# Patient Record
Sex: FEMALE | Race: WHITE | URBAN - METROPOLITAN AREA
[De-identification: names, ages, dates, MRNs, and addresses within clinical notes are randomized per-mention and may not be internally consistent; named-entity substitution may affect disease eponyms.]

---

## 2017-06-12 ENCOUNTER — EMERGENCY (EMERGENCY)
Facility: HOSPITAL | Age: 52
LOS: 1 days | Discharge: HOME | End: 2017-06-12
Attending: INTERNAL MEDICINE | Admitting: INTERNAL MEDICINE
Payer: COMMERCIAL

## 2017-06-12 VITALS
TEMPERATURE: 99 F | SYSTOLIC BLOOD PRESSURE: 106 MMHG | OXYGEN SATURATION: 100 % | RESPIRATION RATE: 15 BRPM | HEART RATE: 63 BPM | DIASTOLIC BLOOD PRESSURE: 58 MMHG

## 2017-06-12 VITALS
HEIGHT: 68 IN | WEIGHT: 166.01 LBS | TEMPERATURE: 70 F | SYSTOLIC BLOOD PRESSURE: 108 MMHG | DIASTOLIC BLOOD PRESSURE: 60 MMHG | OXYGEN SATURATION: 100 %

## 2017-06-12 DIAGNOSIS — Z90.49 ACQUIRED ABSENCE OF OTHER SPECIFIED PARTS OF DIGESTIVE TRACT: Chronic | ICD-10-CM

## 2017-06-12 DIAGNOSIS — Z98.89 OTHER SPECIFIED POSTPROCEDURAL STATES: Chronic | ICD-10-CM

## 2017-06-12 DIAGNOSIS — Z98.84 BARIATRIC SURGERY STATUS: Chronic | ICD-10-CM

## 2017-06-12 DIAGNOSIS — Z87.828 PERSONAL HISTORY OF OTHER (HEALED) PHYSICAL INJURY AND TRAUMA: Chronic | ICD-10-CM

## 2017-06-12 LAB
ALBUMIN SERPL ELPH-MCNC: 3.2 G/DL — LOW (ref 3.3–5)
ALP SERPL-CCNC: 61 U/L — SIGNIFICANT CHANGE UP (ref 30–120)
ALT FLD-CCNC: 18 U/L DA — SIGNIFICANT CHANGE UP (ref 10–60)
ANION GAP SERPL CALC-SCNC: 6 MMOL/L — SIGNIFICANT CHANGE UP (ref 5–17)
APTT BLD: 34.3 SEC — SIGNIFICANT CHANGE UP (ref 27.5–37.4)
AST SERPL-CCNC: 20 U/L — SIGNIFICANT CHANGE UP (ref 10–40)
BASOPHILS # BLD AUTO: 0 K/UL — SIGNIFICANT CHANGE UP (ref 0–0.2)
BASOPHILS NFR BLD AUTO: 1 % — SIGNIFICANT CHANGE UP (ref 0–2)
BILIRUB SERPL-MCNC: 0.4 MG/DL — SIGNIFICANT CHANGE UP (ref 0.2–1.2)
BUN SERPL-MCNC: 8 MG/DL — SIGNIFICANT CHANGE UP (ref 7–23)
CALCIUM SERPL-MCNC: 8.4 MG/DL — SIGNIFICANT CHANGE UP (ref 8.4–10.5)
CHLORIDE SERPL-SCNC: 110 MMOL/L — HIGH (ref 96–108)
CK MB BLD-MCNC: 1.2 % — SIGNIFICANT CHANGE UP (ref 0–3.5)
CK MB CFR SERPL CALC: 0.3 NG/ML — SIGNIFICANT CHANGE UP (ref 0–3.6)
CK SERPL-CCNC: 26 U/L — SIGNIFICANT CHANGE UP (ref 26–192)
CO2 SERPL-SCNC: 25 MMOL/L — SIGNIFICANT CHANGE UP (ref 22–31)
CREAT SERPL-MCNC: 0.49 MG/DL — LOW (ref 0.5–1.3)
EOSINOPHIL # BLD AUTO: 0.1 K/UL — SIGNIFICANT CHANGE UP (ref 0–0.5)
EOSINOPHIL NFR BLD AUTO: 1.8 % — SIGNIFICANT CHANGE UP (ref 0–6)
GLUCOSE SERPL-MCNC: 99 MG/DL — SIGNIFICANT CHANGE UP (ref 70–99)
HCG SERPL-ACNC: <1 MIU/ML — SIGNIFICANT CHANGE UP
HCT VFR BLD CALC: 26.1 % — LOW (ref 34.5–45)
HGB BLD-MCNC: 7.8 G/DL — LOW (ref 11.5–15.5)
INR BLD: 1.15 RATIO — SIGNIFICANT CHANGE UP (ref 0.88–1.16)
LYMPHOCYTES # BLD AUTO: 2 K/UL — SIGNIFICANT CHANGE UP (ref 1–3.3)
LYMPHOCYTES # BLD AUTO: 47.1 % — HIGH (ref 13–44)
MCHC RBC-ENTMCNC: 18.1 PG — LOW (ref 27–34)
MCHC RBC-ENTMCNC: 29.9 GM/DL — LOW (ref 32–36)
MCV RBC AUTO: 60.4 FL — LOW (ref 80–100)
MONOCYTES # BLD AUTO: 0.3 K/UL — SIGNIFICANT CHANGE UP (ref 0–0.9)
MONOCYTES NFR BLD AUTO: 6.4 % — SIGNIFICANT CHANGE UP (ref 2–14)
NEUTROPHILS # BLD AUTO: 1.8 K/UL — SIGNIFICANT CHANGE UP (ref 1.8–7.4)
NEUTROPHILS NFR BLD AUTO: 43.7 % — SIGNIFICANT CHANGE UP (ref 43–77)
PLATELET # BLD AUTO: 246 K/UL — SIGNIFICANT CHANGE UP (ref 150–400)
POTASSIUM SERPL-MCNC: 3.8 MMOL/L — SIGNIFICANT CHANGE UP (ref 3.5–5.3)
POTASSIUM SERPL-SCNC: 3.8 MMOL/L — SIGNIFICANT CHANGE UP (ref 3.5–5.3)
PROT SERPL-MCNC: 6.7 G/DL — SIGNIFICANT CHANGE UP (ref 6–8.3)
PROTHROM AB SERPL-ACNC: 12.6 SEC — SIGNIFICANT CHANGE UP (ref 9.8–12.7)
RBC # BLD: 4.32 M/UL — SIGNIFICANT CHANGE UP (ref 3.8–5.2)
RBC # FLD: 17.8 % — HIGH (ref 10.3–14.5)
SODIUM SERPL-SCNC: 141 MMOL/L — SIGNIFICANT CHANGE UP (ref 135–145)
TROPONIN I SERPL-MCNC: 0.01 NG/ML — LOW (ref 0.02–0.06)
WBC # BLD: 4.2 K/UL — SIGNIFICANT CHANGE UP (ref 3.8–10.5)
WBC # FLD AUTO: 4.2 K/UL — SIGNIFICANT CHANGE UP (ref 3.8–10.5)

## 2017-06-12 PROCEDURE — 36415 COLL VENOUS BLD VENIPUNCTURE: CPT

## 2017-06-12 PROCEDURE — 85730 THROMBOPLASTIN TIME PARTIAL: CPT

## 2017-06-12 PROCEDURE — 86850 RBC ANTIBODY SCREEN: CPT

## 2017-06-12 PROCEDURE — 99285 EMERGENCY DEPT VISIT HI MDM: CPT

## 2017-06-12 PROCEDURE — 85027 COMPLETE CBC AUTOMATED: CPT

## 2017-06-12 PROCEDURE — 82553 CREATINE MB FRACTION: CPT

## 2017-06-12 PROCEDURE — 85610 PROTHROMBIN TIME: CPT

## 2017-06-12 PROCEDURE — 82550 ASSAY OF CK (CPK): CPT

## 2017-06-12 PROCEDURE — 99284 EMERGENCY DEPT VISIT MOD MDM: CPT | Mod: 25

## 2017-06-12 PROCEDURE — 86901 BLOOD TYPING SEROLOGIC RH(D): CPT

## 2017-06-12 PROCEDURE — P9016: CPT

## 2017-06-12 PROCEDURE — 36430 TRANSFUSION BLD/BLD COMPNT: CPT

## 2017-06-12 PROCEDURE — 93010 ELECTROCARDIOGRAM REPORT: CPT

## 2017-06-12 PROCEDURE — 84702 CHORIONIC GONADOTROPIN TEST: CPT

## 2017-06-12 PROCEDURE — 84484 ASSAY OF TROPONIN QUANT: CPT

## 2017-06-12 PROCEDURE — 86920 COMPATIBILITY TEST SPIN: CPT

## 2017-06-12 PROCEDURE — 93005 ELECTROCARDIOGRAM TRACING: CPT

## 2017-06-12 PROCEDURE — 80053 COMPREHEN METABOLIC PANEL: CPT

## 2017-06-12 PROCEDURE — 99285 EMERGENCY DEPT VISIT HI MDM: CPT | Mod: 25

## 2017-06-12 PROCEDURE — 86900 BLOOD TYPING SEROLOGIC ABO: CPT

## 2017-06-12 RX ORDER — ACETAMINOPHEN 500 MG
650 TABLET ORAL ONCE
Qty: 0 | Refills: 0 | Status: COMPLETED | OUTPATIENT
Start: 2017-06-12 | End: 2017-06-12

## 2017-06-12 RX ADMIN — Medication 650 MILLIGRAM(S): at 16:06

## 2017-06-12 RX ADMIN — Medication 650 MILLIGRAM(S): at 15:00

## 2017-06-12 NOTE — CONSULT NOTE ADULT - ASSESSMENT
52 yo woman with history of gastric bypass in 2000 with resultant malasorption of vitamins including B12 and Iron as demonstrated on blood tests from 7/2016; now presents again with profound microcytic anemia  - check stool guaiac  - transfuse 2 units pRBCs; if clinically stable, may be discharged home thereafter  - patient to follow up in office to make arrangements for IV iron therapy and parenteral B12 given post-bypass syndrome and lack of ability to absorb these nutrients orally  - case discussed with ER staff and patient

## 2017-06-12 NOTE — CONSULT NOTE ADULT - SUBJECTIVE AND OBJECTIVE BOX
Patient is a 51y old  Female who presents with a chief complaint of fatigue and malaise    HPI:  50 yo woman with history of gastric bypass surgery in  who was previously diagnosed with malabsorption of iron and was under the care of Dr. Keys, receiving IV iron therapy until beginning of  when she reports that her insurance would not allow her to do so any more. She has had multiple ER visits and required transfusions of pRBCs as she does not respond to oral iron replacement. In  while hospitalized at [facility] she underwent endoscopy and colonoscopy which she reports was negative. She now present to the ER with dizziness, lightheadedness and generalized malaise; found to have Hg drop to 7.8; previously she has been as low as 6.6.    ROS:  Negative except for: lightheadedness, fatigue, exertional dyspnea    PAST MEDICAL & SURGICAL HISTORY:  Psoriasis  Kidney infection  Anemia  H/O gastric bypass   H/O  section: x 3  S/P cholecystectomy  H/O back injury: surgery       SOCIAL HISTORY:  - denies tobacco  - denies excessive alcohol  - denies illicit drug use  - works as     FAMILY HISTORY:  no history of blood disorder or malignancy    MEDICATIONS  (STANDING):  - none      Allergies  - No Known Allergies    Vital Signs Last 24 Hrs  T(C): 37.1, Max: 37.1 (06-12 @ 12:15)  T(F): 98.7, Max: 98.7 (06-12 @ 12:15)  BP: 98/62 (98/62 - 108/60)  BP(mean): --  RR: 14 (14 - 14)  SpO2: 100% (100% - 100%)    PHYSICAL EXAM  General: adult in NAD  HEENT: clear oropharynx, anicteric sclera, pink conjunctivae  Neck: supple  CV: normal S1S2 with no murmur rubs or gallops  Lungs: clear to auscultation, no wheezes, no rhales  Abdomen: soft non-tender non-distended, no hepato/splenomegaly  Ext: no clubbing cyanosis or edema  Skin: no rashes and no petichiae  Neuro: alert and oriented X3 no focal deficits      LABS:    CBC Full  -  ( 2017 10:32 )  WBC Count : 4.2 K/uL  Hemoglobin : 7.8 g/dL  Hematocrit : 26.1 %  Platelet Count - Automated : 246 K/uL  Mean Cell Volume : 60.4 fl  Mean Cell Hemoglobin : 18.1 pg  Mean Cell Hemoglobin Concentration : 29.9 gm/dL  Auto Neutrophil # : 1.8 K/uL  Auto Lymphocyte # : 2.0 K/uL  Auto Monocyte # : 0.3 K/uL  Auto Eosinophil # : 0.1 K/uL  Auto Basophil # : 0.0 K/uL  Auto Neutrophil % : 43.7 %  Auto Lymphocyte % : 47.1 %  Auto Monocyte % : 06.4 %  Auto Eosinophil % : 1.8 %  Auto Basophil % : 1.0 %        141  |  110<H>  |  8   ----------------------------<  99  3.8   |  25  |  0.49<L>    Ca    8.4      2017 10:32    TPro  6.7  /  Alb  3.2<L>  /  TBili  0.4  /  DBili  x   /  AST  20  /  ALT  18  /  AlkPhos  61  06-12    PT/INR - ( 2017 10:32 )   PT: 12.6 sec;   INR: 1.15 ratio         PTT - ( 2017 10:32 )  PTT:34.3 sec    LABS FROM 2016 - B12 78; Iron 22, %sat5, Ferritin 2.6    BLOOD SMEAR INTERPRETATION:    * RBC - microcytic, hypochromic; extensive anisiocytosis with micorcytes, ovalocytes, elliptocytes; no Rouleaux or targeted RBCs  * WBC - neutrophils with normal morphology, small mature lymphs, no evidence of left shift  * Platelets - normal in morphology; slightly increased in number

## 2017-06-12 NOTE — ED PROVIDER NOTE - OBJECTIVE STATEMENT
52 yo wf with h/o anemia thought to be due to bariatric surgery/malabsorption, who used to get prbc transfusion,then iron infusions until insurance denied this.no n/v or black stool.has been having sob on min exertion,fatigue,palps without cp and feels that she has been "in fog" lately.no fever.

## 2017-06-12 NOTE — ED PROVIDER NOTE - MEDICAL DECISION MAKING DETAILS
h/h is 7/26...has been on po Fe without help..no overt bledding..will admit as pt is symptomatic.. h/h is 7/26...has been on po Fe without help..no overt bleeding..will transfuse 2 units prbc in er..hematologist saw pt in er and will f/u as outpatient.has component  of chronic fe deficiency and b12 deficiency.

## 2017-07-31 ENCOUNTER — EMERGENCY (EMERGENCY)
Facility: HOSPITAL | Age: 52
LOS: 0 days | Discharge: HOME | End: 2017-07-31
Attending: EMERGENCY MEDICINE | Admitting: EMERGENCY MEDICINE
Payer: COMMERCIAL

## 2017-07-31 VITALS
DIASTOLIC BLOOD PRESSURE: 77 MMHG | RESPIRATION RATE: 17 BRPM | TEMPERATURE: 99 F | HEART RATE: 69 BPM | WEIGHT: 160.06 LBS | SYSTOLIC BLOOD PRESSURE: 129 MMHG | OXYGEN SATURATION: 100 % | HEIGHT: 66 IN
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
## 2017-07-31 DIAGNOSIS — S89.92XA UNSPECIFIED INJURY OF LEFT LOWER LEG, INITIAL ENCOUNTER: ICD-10-CM

## 2017-07-31 DIAGNOSIS — Z98.89 OTHER SPECIFIED POSTPROCEDURAL STATES: Chronic | ICD-10-CM

## 2017-07-31 DIAGNOSIS — Z90.49 ACQUIRED ABSENCE OF OTHER SPECIFIED PARTS OF DIGESTIVE TRACT: Chronic | ICD-10-CM

## 2017-07-31 DIAGNOSIS — M25.511 PAIN IN RIGHT SHOULDER: ICD-10-CM

## 2017-07-31 DIAGNOSIS — Z98.84 BARIATRIC SURGERY STATUS: Chronic | ICD-10-CM
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
## 2017-07-31 DIAGNOSIS — Z87.828 PERSONAL HISTORY OF OTHER (HEALED) PHYSICAL INJURY AND TRAUMA: Chronic | ICD-10-CM

## 2017-07-31 DIAGNOSIS — M25.562 PAIN IN LEFT KNEE: ICD-10-CM

## 2017-07-31 PROCEDURE — 99284 EMERGENCY DEPT VISIT MOD MDM: CPT

## 2017-07-31 PROCEDURE — 73562 X-RAY EXAM OF KNEE 3: CPT | Mod: 26,LT

## 2017-07-31 PROCEDURE — 73030 X-RAY EXAM OF SHOULDER: CPT | Mod: 26,RT

## 2017-07-31 RX ORDER — IBUPROFEN 200 MG
600 TABLET ORAL ONCE
Refills: 0 | Status: COMPLETED | OUTPATIENT
Start: 2017-07-31 | End: 2017-07-31

## 2017-07-31 RX ORDER — CYCLOBENZAPRINE HYDROCHLORIDE 10 MG/1
10 TABLET, FILM COATED ORAL ONCE
Refills: 0 | Status: COMPLETED | OUTPATIENT
Start: 2017-07-31 | End: 2017-07-31

## 2017-07-31 RX ORDER — CYCLOBENZAPRINE HYDROCHLORIDE 10 MG/1
1 TABLET, FILM COATED ORAL
Qty: 15 | Refills: 0
Start: 2017-07-31 | End: 2017-08-05

## 2017-07-31 RX ADMIN — CYCLOBENZAPRINE HYDROCHLORIDE 10 MILLIGRAM(S): 10 TABLET, FILM COATED ORAL at 09:05

## 2017-07-31 RX ADMIN — Medication 600 MILLIGRAM(S): at 09:05
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
## 2017-09-01 ENCOUNTER — TRANSCRIPTION ENCOUNTER (OUTPATIENT)
Age: 52
End: 2017-09-01

## 2017-12-06 ENCOUNTER — TRANSCRIPTION ENCOUNTER (OUTPATIENT)
Age: 52
End: 2017-12-06

## 2018-06-18 ENCOUNTER — TRANSCRIPTION ENCOUNTER (OUTPATIENT)
Age: 53
End: 2018-06-18

## 2018-11-14 NOTE — ED ADULT TRIAGE NOTE - BP NONINVASIVE DIASTOLIC (MM HG)
26 yo  at 16wks by LMP c/w first trimester sonogram presents c/o abnormal vaginal discharge for the past 2 days. She describes discharge as thick, white and green in colour. She also reports left sided pelvic pain, constant and sharp. She denies fever/chills, HA, dizziness, CP, palpitations, SOB, n/v, vaginal bleeding, dysuria.  Antepartum course: prenatal care w/ Dr. Patel, normal NT, treated for BV 1 month ago w/ metrogel  OBHx: VTOP x 2 w/ D&C  GYNHx: denies hx of uterine fibroids, ovarian cysts, abnormal pap smears, infections  MedHx: intermittent asthma, albuterol PRN, never hospitalized or intubated, anxiety/depression, IBS  SurgHx: D&C  NKDA    Vital Signs Last 24 Hrs  T(C): 36.8 (2018 20:36), Max: 36.8 (2018 20:36)  T(F): 98.2 (2018 20:36), Max: 98.2 (2018 20:36)  HR: 80 (2018 20:36) (80 - 80)  BP: 93/64 (2018 20:36) (93/64 - 93/64)  BP(mean): --  RR: 18 (2018 20:36) (18 - 18)  SpO2: 100% (2018 20:36) (100% - 100%)  GA: NAD, A+OX3  CV: RRR, no MRG  Pulm: CTAB  Abd: soft, NTND, no rebound or guarding  Spec: thick white clumpy discharge w/ green tinge, cervix appears normal, no vaginal bleeding                          13.2   10.5  )-----------( 287      ( 2018 21:00 )             39.1   11-13    136  |  95<L>  |  8   ----------------------------<  85  4.0   |  24  |  0.63    Ca    9.3      2018 21:00    TPro  7.6  /  Alb  4.1  /  TBili  0.2  /  DBili  x   /  AST  20  /  ALT  12  /  AlkPhos  38<L>  11-13  Urinalysis Basic - ( 2018 21:00 )    Color: Yellow / Appearance: Clear / S.010 / pH: x  Gluc: x / Ketone: Trace mg/dL  / Bili: Negative / Urobili: 0.2 E.U./dL   Blood: x / Protein: NEGATIVE mg/dL / Nitrite: NEGATIVE   Leuk Esterase: Moderate / RBC: < 5 /HPF / WBC 5-10 /HPF   Sq Epi: x / Non Sq Epi: 5-10 /HPF / Bacteria: Present /HPF    < from: US Echo Transvaginal, OB (..18 @ 23:23) >    EXAM:  US OB GRT THAN 14 WKS 1ST GEST                          EXAM:  US OB TRANSVAGINAL                          PROCEDURE DATE:  2018          INTERPRETATION:  OBSTETRICAL ULTRASOUND - SECOND TRIMESTER dated   2018 11:23 PM    INDICATION: 27 years Female with second trimester pregnancy and thick   dark yellow discharge LMP: 2018    TECHNIQUE: Transabdominal and transvaginal views of the pelvis were   obtained.    PRIOR STUDIES: None.    FINDINGS:   By dates, the estimated gestational age is 16 weeks 0 days.      A single intrauterine gestation is visible with an average ultrasound age   of 16 weeks 2 days.     Biparietal diameter is 3.6 cm, corresponding to a gestational age of 17   weeks 1 day.    Head circumference is 12.7 cm, corresponding to a gestational age of 16   weeks 4 days.    Abdominal circumference is 9.4 cm, corresponding to a gestational age of   15 weeks 4 days.    Femur length is 1.9 cm, corresponding to a gestational age of 15 weeks 5   days.    Fetal cardiac motion is visible with fetal heart rate of 152 beats per   minute.    The visualized fetal anatomy, including lateral ventricles, spinal   column, stomach, umbilical cord insertion site and bladder are   demonstrated, and no abnormalities are visualized.  A 3-vessel umbilical   cord is evident.    A normal amount of amniotic fluid is evident. The placenta is located   posteriorly. No placenta previa is evident.  No subchorionic bleed is   visible.  The cervix is closed with a length of 3.8 cm.    The maternal bilateral ovaries are not visualized.    IMPRESSION:   Single intrauterine gestation correlating to a gestational age of 16   weeks 2 days. Fetal heart rate detected at 152 bpm.      "Thank you for the opportunity toparticipate in the care of this   patient."    RENA ARIAS M.D., RADIOLOGY RESIDENT  This document has been electronically signed.  MAGED DUGGAN M.D., ATTENDING RADIOLOGIST  This document has been electronically signed. 2018 12:21AM         < end of copied text >
77

## 2019-07-30 ENCOUNTER — EMERGENCY (EMERGENCY)
Facility: HOSPITAL | Age: 54
LOS: 0 days | Discharge: HOME | End: 2019-07-30
Attending: EMERGENCY MEDICINE | Admitting: EMERGENCY MEDICINE
Payer: COMMERCIAL

## 2019-07-30 VITALS — HEIGHT: 67 IN | WEIGHT: 164.02 LBS

## 2019-07-30 VITALS
RESPIRATION RATE: 17 BRPM | SYSTOLIC BLOOD PRESSURE: 101 MMHG | OXYGEN SATURATION: 97 % | DIASTOLIC BLOOD PRESSURE: 61 MMHG | HEART RATE: 65 BPM

## 2019-07-30 DIAGNOSIS — Z98.84 BARIATRIC SURGERY STATUS: ICD-10-CM

## 2019-07-30 DIAGNOSIS — D50.9 IRON DEFICIENCY ANEMIA, UNSPECIFIED: ICD-10-CM

## 2019-07-30 DIAGNOSIS — D63.8 ANEMIA IN OTHER CHRONIC DISEASES CLASSIFIED ELSEWHERE: ICD-10-CM

## 2019-07-30 DIAGNOSIS — Z98.84 BARIATRIC SURGERY STATUS: Chronic | ICD-10-CM

## 2019-07-30 DIAGNOSIS — Z90.49 ACQUIRED ABSENCE OF OTHER SPECIFIED PARTS OF DIGESTIVE TRACT: Chronic | ICD-10-CM

## 2019-07-30 DIAGNOSIS — R42 DIZZINESS AND GIDDINESS: ICD-10-CM

## 2019-07-30 DIAGNOSIS — R00.2 PALPITATIONS: ICD-10-CM

## 2019-07-30 DIAGNOSIS — Z87.828 PERSONAL HISTORY OF OTHER (HEALED) PHYSICAL INJURY AND TRAUMA: Chronic | ICD-10-CM

## 2019-07-30 DIAGNOSIS — Z98.89 OTHER SPECIFIED POSTPROCEDURAL STATES: Chronic | ICD-10-CM

## 2019-07-30 DIAGNOSIS — E61.1 IRON DEFICIENCY: ICD-10-CM

## 2019-07-30 LAB
ABO RH CONFIRMATION: SIGNIFICANT CHANGE UP
ACANTHOCYTES BLD QL SMEAR: SLIGHT — SIGNIFICANT CHANGE UP
ALBUMIN SERPL ELPH-MCNC: 3.8 G/DL — SIGNIFICANT CHANGE UP (ref 3.3–5)
ALP SERPL-CCNC: 76 U/L — SIGNIFICANT CHANGE UP (ref 40–120)
ALT FLD-CCNC: 20 U/L — SIGNIFICANT CHANGE UP (ref 12–78)
ANION GAP SERPL CALC-SCNC: 4 MMOL/L — LOW (ref 5–17)
ANISOCYTOSIS BLD QL: SIGNIFICANT CHANGE UP
APTT BLD: 32.3 SEC — SIGNIFICANT CHANGE UP (ref 27.5–36.3)
AST SERPL-CCNC: 16 U/L — SIGNIFICANT CHANGE UP (ref 15–37)
BASOPHILS # BLD AUTO: 0 K/UL — SIGNIFICANT CHANGE UP (ref 0–0.2)
BASOPHILS NFR BLD AUTO: 0 % — SIGNIFICANT CHANGE UP (ref 0–2)
BILIRUB SERPL-MCNC: 0.4 MG/DL — SIGNIFICANT CHANGE UP (ref 0.2–1.2)
BLD GP AB SCN SERPL QL: SIGNIFICANT CHANGE UP
BUN SERPL-MCNC: 13 MG/DL — SIGNIFICANT CHANGE UP (ref 7–23)
CALCIUM SERPL-MCNC: 8.7 MG/DL — SIGNIFICANT CHANGE UP (ref 8.5–10.1)
CHLORIDE SERPL-SCNC: 114 MMOL/L — HIGH (ref 96–108)
CO2 SERPL-SCNC: 27 MMOL/L — SIGNIFICANT CHANGE UP (ref 22–31)
CREAT SERPL-MCNC: 0.69 MG/DL — SIGNIFICANT CHANGE UP (ref 0.5–1.3)
ELLIPTOCYTES BLD QL SMEAR: SLIGHT — SIGNIFICANT CHANGE UP
EOSINOPHIL # BLD AUTO: 0.04 K/UL — SIGNIFICANT CHANGE UP (ref 0–0.5)
EOSINOPHIL NFR BLD AUTO: 1 % — SIGNIFICANT CHANGE UP (ref 0–6)
GLUCOSE SERPL-MCNC: 86 MG/DL — SIGNIFICANT CHANGE UP (ref 70–99)
HCT VFR BLD CALC: 27.6 % — LOW (ref 34.5–45)
HGB BLD-MCNC: 7.8 G/DL — LOW (ref 11.5–15.5)
HYPOCHROMIA BLD QL: SIGNIFICANT CHANGE UP
INR BLD: 1.03 RATIO — SIGNIFICANT CHANGE UP (ref 0.88–1.16)
LYMPHOCYTES # BLD AUTO: 2.15 K/UL — SIGNIFICANT CHANGE UP (ref 1–3.3)
LYMPHOCYTES # BLD AUTO: 50 % — HIGH (ref 13–44)
MACROCYTES BLD QL: SLIGHT — SIGNIFICANT CHANGE UP
MANUAL SMEAR VERIFICATION: SIGNIFICANT CHANGE UP
MCHC RBC-ENTMCNC: 17.6 PG — LOW (ref 27–34)
MCHC RBC-ENTMCNC: 28.3 GM/DL — LOW (ref 32–36)
MCV RBC AUTO: 62.4 FL — LOW (ref 80–100)
MICROCYTES BLD QL: SIGNIFICANT CHANGE UP
MONOCYTES # BLD AUTO: 0.22 K/UL — SIGNIFICANT CHANGE UP (ref 0–0.9)
MONOCYTES NFR BLD AUTO: 5 % — SIGNIFICANT CHANGE UP (ref 2–14)
NEUTROPHILS # BLD AUTO: 1.89 K/UL — SIGNIFICANT CHANGE UP (ref 1.8–7.4)
NEUTROPHILS NFR BLD AUTO: 44 % — SIGNIFICANT CHANGE UP (ref 43–77)
NRBC # BLD: 0 /100 — SIGNIFICANT CHANGE UP (ref 0–0)
NRBC # BLD: SIGNIFICANT CHANGE UP /100 WBCS (ref 0–0)
PLAT MORPH BLD: NORMAL — SIGNIFICANT CHANGE UP
PLATELET # BLD AUTO: 223 K/UL — SIGNIFICANT CHANGE UP (ref 150–400)
POIKILOCYTOSIS BLD QL AUTO: SIGNIFICANT CHANGE UP
POTASSIUM SERPL-MCNC: 4.5 MMOL/L — SIGNIFICANT CHANGE UP (ref 3.5–5.3)
POTASSIUM SERPL-SCNC: 4.5 MMOL/L — SIGNIFICANT CHANGE UP (ref 3.5–5.3)
PROT SERPL-MCNC: 7.1 GM/DL — SIGNIFICANT CHANGE UP (ref 6–8.3)
PROTHROM AB SERPL-ACNC: 11.4 SEC — SIGNIFICANT CHANGE UP (ref 10–12.9)
RBC # BLD: 4.42 M/UL — SIGNIFICANT CHANGE UP (ref 3.8–5.2)
RBC # FLD: 21.7 % — HIGH (ref 10.3–14.5)
RBC BLD AUTO: ABNORMAL
SCHISTOCYTES BLD QL AUTO: SLIGHT — SIGNIFICANT CHANGE UP
SODIUM SERPL-SCNC: 145 MMOL/L — SIGNIFICANT CHANGE UP (ref 135–145)
WBC # BLD: 4.3 K/UL — SIGNIFICANT CHANGE UP (ref 3.8–10.5)
WBC # FLD AUTO: 4.3 K/UL — SIGNIFICANT CHANGE UP (ref 3.8–10.5)

## 2019-07-30 PROCEDURE — 99285 EMERGENCY DEPT VISIT HI MDM: CPT | Mod: 25

## 2019-07-30 PROCEDURE — 93010 ELECTROCARDIOGRAM REPORT: CPT

## 2019-07-30 RX ORDER — DIPHENHYDRAMINE HCL 50 MG
25 CAPSULE ORAL ONCE
Refills: 0 | Status: COMPLETED | OUTPATIENT
Start: 2019-07-30 | End: 2019-07-30

## 2019-07-30 RX ORDER — ACETAMINOPHEN 500 MG
975 TABLET ORAL ONCE
Refills: 0 | Status: COMPLETED | OUTPATIENT
Start: 2019-07-30 | End: 2019-07-30

## 2019-07-30 RX ADMIN — Medication 975 MILLIGRAM(S): at 23:42

## 2019-07-30 RX ADMIN — Medication 975 MILLIGRAM(S): at 21:40

## 2019-07-30 NOTE — ED STATDOCS - PROGRESS NOTE DETAILS
52 yo female with a PMH of malabsorption syndrome, anemia (was receiving IV iron infusion was oct 2018, and last blood transfusion was last summer) presents with dizziness, weakness x 2 weeks. Pt would wake up in the middle of the night with charley horses in her legs. Pt states that her insurance stopped covering the infusion of iron and would get these episodes intermittently . Denies abd pain, n/v/d, f/c/sweating.  Labs, EKG, Reeval. -Alon Manuel PA-C Feeling much improved after blood transfusion.  Okay for d/c home at this time. Cuba Kurtz D.O.
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
## 2019-07-30 NOTE — ED STATDOCS - CLINICAL SUMMARY MEDICAL DECISION MAKING FREE TEXT BOX
EKG unremarkable.  Pt with microcytic anemia to 7.7.  Denies vaginal or rectal bleeding.  Likely 2/2 chronic iron deficiency, 2/2 malabsorption.  Pt given 1U PRBC with improvement.  Okay for d/c home, f/u with hematology.

## 2019-07-30 NOTE — ED ADULT NURSE NOTE - OBJECTIVE STATEMENT
Pt hx anemia stating "I feel dizzy and I think its because my blood count is down".  No other complaints, pt ambulatory.  VSS, safety maintained

## 2019-07-30 NOTE — ED ADULT TRIAGE NOTE - CHIEF COMPLAINT QUOTE
pt aaox4, pmhx anemia, pt presents to er c/o dizziness, sob, LUQ pain "I think my blood count is low."  hr 84, oxygen 97% at pivot

## 2020-02-03 ENCOUNTER — EMERGENCY (EMERGENCY)
Facility: HOSPITAL | Age: 55
LOS: 0 days | Discharge: HOME | End: 2020-02-03
Attending: FAMILY MEDICINE
Payer: COMMERCIAL

## 2020-02-03 VITALS
RESPIRATION RATE: 18 BRPM | SYSTOLIC BLOOD PRESSURE: 119 MMHG | TEMPERATURE: 98 F | OXYGEN SATURATION: 100 % | DIASTOLIC BLOOD PRESSURE: 73 MMHG | HEART RATE: 75 BPM

## 2020-02-03 VITALS — WEIGHT: 184.97 LBS | HEIGHT: 69 IN

## 2020-02-03 DIAGNOSIS — R50.9 FEVER, UNSPECIFIED: ICD-10-CM

## 2020-02-03 DIAGNOSIS — Z98.89 OTHER SPECIFIED POSTPROCEDURAL STATES: Chronic | ICD-10-CM

## 2020-02-03 DIAGNOSIS — M25.50 PAIN IN UNSPECIFIED JOINT: ICD-10-CM

## 2020-02-03 DIAGNOSIS — Z90.49 ACQUIRED ABSENCE OF OTHER SPECIFIED PARTS OF DIGESTIVE TRACT: Chronic | ICD-10-CM

## 2020-02-03 DIAGNOSIS — L40.9 PSORIASIS, UNSPECIFIED: ICD-10-CM

## 2020-02-03 DIAGNOSIS — Z98.84 BARIATRIC SURGERY STATUS: ICD-10-CM

## 2020-02-03 DIAGNOSIS — Z98.84 BARIATRIC SURGERY STATUS: Chronic | ICD-10-CM

## 2020-02-03 DIAGNOSIS — R53.81 OTHER MALAISE: ICD-10-CM

## 2020-02-03 DIAGNOSIS — Z87.828 PERSONAL HISTORY OF OTHER (HEALED) PHYSICAL INJURY AND TRAUMA: Chronic | ICD-10-CM

## 2020-02-03 DIAGNOSIS — R21 RASH AND OTHER NONSPECIFIC SKIN ERUPTION: ICD-10-CM

## 2020-02-03 PROBLEM — D64.9 ANEMIA, UNSPECIFIED: Chronic | Status: ACTIVE | Noted: 2019-07-30

## 2020-02-03 LAB
ADD ON TEST-SPECIMEN IN LAB: SIGNIFICANT CHANGE UP
ALBUMIN SERPL ELPH-MCNC: 3.6 G/DL — SIGNIFICANT CHANGE UP (ref 3.3–5)
ALP SERPL-CCNC: 193 U/L — HIGH (ref 40–120)
ALT FLD-CCNC: 45 U/L — SIGNIFICANT CHANGE UP (ref 12–78)
ANION GAP SERPL CALC-SCNC: 6 MMOL/L — SIGNIFICANT CHANGE UP (ref 5–17)
APPEARANCE UR: CLEAR — SIGNIFICANT CHANGE UP
APTT BLD: 36.4 SEC — HIGH (ref 27.5–36.3)
AST SERPL-CCNC: 23 U/L — SIGNIFICANT CHANGE UP (ref 15–37)
BASOPHILS # BLD AUTO: 0.02 K/UL — SIGNIFICANT CHANGE UP (ref 0–0.2)
BASOPHILS NFR BLD AUTO: 0.3 % — SIGNIFICANT CHANGE UP (ref 0–2)
BILIRUB SERPL-MCNC: 0.5 MG/DL — SIGNIFICANT CHANGE UP (ref 0.2–1.2)
BILIRUB UR-MCNC: NEGATIVE — SIGNIFICANT CHANGE UP
BUN SERPL-MCNC: 15 MG/DL — SIGNIFICANT CHANGE UP (ref 7–23)
CALCIUM SERPL-MCNC: 8.9 MG/DL — SIGNIFICANT CHANGE UP (ref 8.5–10.1)
CHLORIDE SERPL-SCNC: 106 MMOL/L — SIGNIFICANT CHANGE UP (ref 96–108)
CO2 SERPL-SCNC: 28 MMOL/L — SIGNIFICANT CHANGE UP (ref 22–31)
COLOR SPEC: YELLOW — SIGNIFICANT CHANGE UP
CREAT SERPL-MCNC: 0.51 MG/DL — SIGNIFICANT CHANGE UP (ref 0.5–1.3)
DIFF PNL FLD: NEGATIVE — SIGNIFICANT CHANGE UP
EOSINOPHIL # BLD AUTO: 0.1 K/UL — SIGNIFICANT CHANGE UP (ref 0–0.5)
EOSINOPHIL NFR BLD AUTO: 1.4 % — SIGNIFICANT CHANGE UP (ref 0–6)
FLU A RESULT: SIGNIFICANT CHANGE UP
FLU A RESULT: SIGNIFICANT CHANGE UP
FLUAV AG NPH QL: SIGNIFICANT CHANGE UP
FLUBV AG NPH QL: SIGNIFICANT CHANGE UP
GLUCOSE SERPL-MCNC: 102 MG/DL — HIGH (ref 70–99)
GLUCOSE UR QL: NEGATIVE MG/DL — SIGNIFICANT CHANGE UP
HCT VFR BLD CALC: 42.4 % — SIGNIFICANT CHANGE UP (ref 34.5–45)
HGB BLD-MCNC: 13.7 G/DL — SIGNIFICANT CHANGE UP (ref 11.5–15.5)
IMM GRANULOCYTES NFR BLD AUTO: 0.4 % — SIGNIFICANT CHANGE UP (ref 0–1.5)
INR BLD: 1.08 RATIO — SIGNIFICANT CHANGE UP (ref 0.88–1.16)
KETONES UR-MCNC: NEGATIVE — SIGNIFICANT CHANGE UP
LACTATE SERPL-SCNC: 1.3 MMOL/L — SIGNIFICANT CHANGE UP (ref 0.7–2)
LEUKOCYTE ESTERASE UR-ACNC: NEGATIVE — SIGNIFICANT CHANGE UP
LYMPHOCYTES # BLD AUTO: 2.18 K/UL — SIGNIFICANT CHANGE UP (ref 1–3.3)
LYMPHOCYTES # BLD AUTO: 30.2 % — SIGNIFICANT CHANGE UP (ref 13–44)
MCHC RBC-ENTMCNC: 29.1 PG — SIGNIFICANT CHANGE UP (ref 27–34)
MCHC RBC-ENTMCNC: 32.3 GM/DL — SIGNIFICANT CHANGE UP (ref 32–36)
MCV RBC AUTO: 90.2 FL — SIGNIFICANT CHANGE UP (ref 80–100)
MONOCYTES # BLD AUTO: 0.28 K/UL — SIGNIFICANT CHANGE UP (ref 0–0.9)
MONOCYTES NFR BLD AUTO: 3.9 % — SIGNIFICANT CHANGE UP (ref 2–14)
NEUTROPHILS # BLD AUTO: 4.62 K/UL — SIGNIFICANT CHANGE UP (ref 1.8–7.4)
NEUTROPHILS NFR BLD AUTO: 63.8 % — SIGNIFICANT CHANGE UP (ref 43–77)
NITRITE UR-MCNC: NEGATIVE — SIGNIFICANT CHANGE UP
PH UR: 6 — SIGNIFICANT CHANGE UP (ref 5–8)
PLATELET # BLD AUTO: 212 K/UL — SIGNIFICANT CHANGE UP (ref 150–400)
POTASSIUM SERPL-MCNC: 4.1 MMOL/L — SIGNIFICANT CHANGE UP (ref 3.5–5.3)
POTASSIUM SERPL-SCNC: 4.1 MMOL/L — SIGNIFICANT CHANGE UP (ref 3.5–5.3)
PROT SERPL-MCNC: 7.6 GM/DL — SIGNIFICANT CHANGE UP (ref 6–8.3)
PROT UR-MCNC: NEGATIVE MG/DL — SIGNIFICANT CHANGE UP
PROTHROM AB SERPL-ACNC: 12 SEC — SIGNIFICANT CHANGE UP (ref 10–12.9)
RBC # BLD: 4.7 M/UL — SIGNIFICANT CHANGE UP (ref 3.8–5.2)
RBC # FLD: 12.2 % — SIGNIFICANT CHANGE UP (ref 10.3–14.5)
RSV RESULT: SIGNIFICANT CHANGE UP
RSV RNA RESP QL NAA+PROBE: SIGNIFICANT CHANGE UP
SODIUM SERPL-SCNC: 140 MMOL/L — SIGNIFICANT CHANGE UP (ref 135–145)
SP GR SPEC: 1.02 — SIGNIFICANT CHANGE UP (ref 1.01–1.02)
UROBILINOGEN FLD QL: 4 MG/DL
WBC # BLD: 7.23 K/UL — SIGNIFICANT CHANGE UP (ref 3.8–10.5)
WBC # FLD AUTO: 7.23 K/UL — SIGNIFICANT CHANGE UP (ref 3.8–10.5)

## 2020-02-03 PROCEDURE — 99285 EMERGENCY DEPT VISIT HI MDM: CPT

## 2020-02-03 PROCEDURE — 99283 EMERGENCY DEPT VISIT LOW MDM: CPT

## 2020-02-03 PROCEDURE — 85610 PROTHROMBIN TIME: CPT

## 2020-02-03 PROCEDURE — 85652 RBC SED RATE AUTOMATED: CPT

## 2020-02-03 PROCEDURE — 93010 ELECTROCARDIOGRAM REPORT: CPT

## 2020-02-03 PROCEDURE — 93005 ELECTROCARDIOGRAM TRACING: CPT

## 2020-02-03 PROCEDURE — 80053 COMPREHEN METABOLIC PANEL: CPT

## 2020-02-03 PROCEDURE — 85730 THROMBOPLASTIN TIME PARTIAL: CPT

## 2020-02-03 PROCEDURE — 71045 X-RAY EXAM CHEST 1 VIEW: CPT

## 2020-02-03 PROCEDURE — 83605 ASSAY OF LACTIC ACID: CPT

## 2020-02-03 PROCEDURE — 87631 RESP VIRUS 3-5 TARGETS: CPT

## 2020-02-03 PROCEDURE — 96374 THER/PROPH/DIAG INJ IV PUSH: CPT

## 2020-02-03 PROCEDURE — 96361 HYDRATE IV INFUSION ADD-ON: CPT

## 2020-02-03 PROCEDURE — 71045 X-RAY EXAM CHEST 1 VIEW: CPT | Mod: 26

## 2020-02-03 PROCEDURE — 99284 EMERGENCY DEPT VISIT MOD MDM: CPT | Mod: 25

## 2020-02-03 PROCEDURE — 87086 URINE CULTURE/COLONY COUNT: CPT

## 2020-02-03 PROCEDURE — 85025 COMPLETE CBC W/AUTO DIFF WBC: CPT

## 2020-02-03 PROCEDURE — 36415 COLL VENOUS BLD VENIPUNCTURE: CPT

## 2020-02-03 PROCEDURE — 81003 URINALYSIS AUTO W/O SCOPE: CPT

## 2020-02-03 PROCEDURE — 87040 BLOOD CULTURE FOR BACTERIA: CPT

## 2020-02-03 RX ORDER — CEFTRIAXONE 500 MG/1
1000 INJECTION, POWDER, FOR SOLUTION INTRAMUSCULAR; INTRAVENOUS ONCE
Refills: 0 | Status: COMPLETED | OUTPATIENT
Start: 2020-02-03 | End: 2020-02-03

## 2020-02-03 RX ORDER — FLUOCINONIDE/EMOLLIENT BASE 0.05 %
1 CREAM (GRAM) TOPICAL
Qty: 1 | Refills: 0
Start: 2020-02-03 | End: 2020-02-12

## 2020-02-03 RX ORDER — SODIUM CHLORIDE 9 MG/ML
2600 INJECTION INTRAMUSCULAR; INTRAVENOUS; SUBCUTANEOUS ONCE
Refills: 0 | Status: COMPLETED | OUTPATIENT
Start: 2020-02-03 | End: 2020-02-03

## 2020-02-03 RX ORDER — FLUOCINONIDE/EMOLLIENT BASE 0.05 %
1 CREAM (GRAM) TOPICAL ONCE
Refills: 0 | Status: COMPLETED | OUTPATIENT
Start: 2020-02-03 | End: 2020-02-03

## 2020-02-03 RX ADMIN — SODIUM CHLORIDE 2600 MILLILITER(S): 9 INJECTION INTRAMUSCULAR; INTRAVENOUS; SUBCUTANEOUS at 14:54

## 2020-02-03 RX ADMIN — CEFTRIAXONE 1000 MILLIGRAM(S): 500 INJECTION, POWDER, FOR SOLUTION INTRAMUSCULAR; INTRAVENOUS at 15:22

## 2020-02-03 RX ADMIN — Medication 1 APPLICATION(S): at 19:17

## 2020-02-03 RX ADMIN — SODIUM CHLORIDE 2600 MILLILITER(S): 9 INJECTION INTRAMUSCULAR; INTRAVENOUS; SUBCUTANEOUS at 16:00

## 2020-02-03 NOTE — ED STATDOCS - PROGRESS NOTE DETAILS
Dana BROWN for ED attending, Dr. Reyes: 55 y/o F with a PMHx of psoriasis, anemia, s/p gastric bypass presents to the ED c/o a generalized body rash associated with joint pain and palpitations. +chills. States she gets psoriasis flares but current episode is worse as it is inflamed and "angry looking." Denies fever. Will send pt to main ED for further evaluation.

## 2020-02-03 NOTE — CONSULT NOTE ADULT - SUBJECTIVE AND OBJECTIVE BOX
Patient is a 54y Female with a 20 year hx of psoriasis, s/p UV txs, cyclosporin and MTX, as well as many topical txs, tx'ed since November on Humira.  She has found absolutely no improvement with this tx, and over the past days has had myalgias, arthralgias, and fevers.  She presented to the ER for evaluation.        PAST MEDICAL & SURGICAL HISTORY:  Anemia  Psoriasis  Kidney infection  Anemia  History of cholecystectomy  H/O gastric bypass  H/O  section: x 3  S/P cholecystectomy  H/O back injury: surgery      Allergies:  No Known Allergies        PE:Vital Signs Last 24 Hrs  T(C): 36.7 (2020 15:30), Max: 37.3 (2020 13:47)  T(F): 98 (2020 15:30), Max: 99.2 (2020 13:47)  HR: 70 (2020 15:30) (70 - 71)  BP: 121/70 (2020 15:30) (121/70 - 121/71)  BP(mean): 87 (2020 15:30) (87 - 87)  RR: 18 (2020 15:30) (18 - 18)  SpO2: 100% (2020 15:30) (96% - 100%)      Skin exam showing markedly erythematous patches and plaques with mild scale - confluent on the abdomen, mid and low back, with involvement of the elbows, and patchy of the shins.  (patient is in the ER hallway - but also c/o vulvar involvement.  Breasts, buttocks, genitalia, upper thighs not examined).      Labs:                        13.7   7.23  )-----------( 212      ( 2020 14:43 )             42.4              02-03    140  |  106  |  15  ----------------------------<  102<H>  4.1   |  28  |  0.51    Ca    8.9      2020 14:43    TPro  7.6  /  Alb  3.6  /  TBili  0.5  /  DBili  x   /  AST  23  /  ALT  45  /  AlkPhos  193<H>  02-03      LIVER FUNCTIONS - ( 2020 14:43 )  Alb: 3.6 g/dL / Pro: 7.6 gm/dL / ALK PHOS: 193 U/L / ALT: 45 U/L / AST: 23 U/L / GGT: x

## 2020-02-03 NOTE — ED PROVIDER NOTE - CLINICAL SUMMARY MEDICAL DECISION MAKING FREE TEXT BOX
Pt with hx of psoriasis on Humera, here with malaise, fever, increased erythema and pain to areas of psoriasis and joint pain. Will obtain labs, chest XR, EKG, abx. Pt with hx of psoriasis on Humera, here with malaise, fever, increased erythema and pain to areas of psoriasis and joint pain. Will obtain labs, chest XR, EKG, abx. Derm consult if possible.

## 2020-02-03 NOTE — ED PROVIDER NOTE - OBJECTIVE STATEMENT
55 y/o female with a PMHx of psoriasis, anemia (last transfusion 6 months ago), kidney infection, s/p gastric bypass presents to the ED c/o  generalized body rash associated with joint pain. States she gets psoriasis flares but current episode is worse than usual. +fever (Tmax 100F), +rash, +joint pain. On Humera since November 2019. Did not get flu vaccine this year. LNMP: 1 year ago. NKDA. No recent travel. Nonsmoker. No EtOH use. No drug use. No other complaints at this time.

## 2020-02-03 NOTE — ED ADULT NURSE NOTE - OBJECTIVE STATEMENT
pt presents to ED with complaints of rash to entire body. pt reports she has hx of psoriasis and over the last few days, her psoriasis has become "more red and angry looking". pt has rash to b/l arms, abdomen, back, and b/l legs. pt also endorsing joint pain, fever starting today, and cough earlier last week. 18 g placed to b/l AC. labs sent.

## 2020-02-03 NOTE — CONSULT NOTE ADULT - ASSESSMENT
Impression:  Psoriasis  This is likely exacerbated by the patients current illness.  She does not appear toxic, and her labs are not impressive.  Please not that with widespread psoriasis, the patient is markedly vasodilated, and therefore can have significant heat loss.  She should be kept warm.  I recommend the Humira be stopped, as she is ill, and it has not been effective.  She should use lidex ointment bid (or tid to start) to all affected areas.  Emollients should be encouraged.  Hold on systemic txs currently until her systemic illness is understood and controlled.    She should f/u in my office after discharge for consideration of longer term tx.    Thanks,  Bryn Romo MD

## 2020-02-03 NOTE — ED PROVIDER NOTE - SKIN, MLM
Skin  dry and intact. Large area of thickened skin anterior abd and back. Mutliple patches on arms and legs. Well demarcated.

## 2020-02-03 NOTE — ED ADULT TRIAGE NOTE - CHIEF COMPLAINT QUOTE
Pt reports red rash "all over body", has a hx of psoriasis on Humira but describes this as different, joints hurt, palpitations, painful, erythema

## 2020-02-03 NOTE — ED ADULT NURSE NOTE - NSIMPLEMENTINTERV_GEN_ALL_ED
Implemented All Universal Safety Interventions:  Starr to call system. Call bell, personal items and telephone within reach. Instruct patient to call for assistance. Room bathroom lighting operational. Non-slip footwear when patient is off stretcher. Physically safe environment: no spills, clutter or unnecessary equipment. Stretcher in lowest position, wheels locked, appropriate side rails in place.

## 2020-02-03 NOTE — ED PROVIDER NOTE - PROGRESS NOTE DETAILS
Dana OLIVA for ED attending, Dr. Granda: Spoke with Dr. Linda who will come to see pt. I Raquel Khan attest that this documentation has been prepared under the direction and in the presence of Doctor Granda.

## 2020-02-03 NOTE — ED PROVIDER NOTE - NSFOLLOWUPINSTRUCTIONS_ED_ALL_ED_FT
Apply lidex ointment twice daily to affected areas. Stop Humira. Follow up with Dr. Romo this week. Return to ER if worse.

## 2020-02-03 NOTE — ED PROVIDER NOTE - CARE PROVIDER_API CALL
Bryn Romo)  Dermatology  [address] [place] [address]  Phone: [phone]  Fax: [phone]  Follow Up Time:

## 2020-02-03 NOTE — ED PROVIDER NOTE - PATIENT PORTAL LINK FT
You can access the FollowMyHealth Patient Portal offered by [facility] by registering at the following website: [email or web address]. By joining [facility]’s FollowMyHealth portal, you will also be able to view your health information using other applications (apps) compatible with our system.

## 2020-02-04 ENCOUNTER — APPOINTMENT (OUTPATIENT)
Dept: DERMATOLOGY | Facility: CLINIC | Age: 55
End: 2020-02-04
Payer: COMMERCIAL

## 2020-02-04 VITALS — HEIGHT: 68 IN | WEIGHT: 190 LBS | BODY MASS INDEX: 28.79 KG/M2

## 2020-02-04 DIAGNOSIS — Z78.9 OTHER SPECIFIED HEALTH STATUS: ICD-10-CM

## 2020-02-04 DIAGNOSIS — Z87.2 PERSONAL HISTORY OF DISEASES OF THE SKIN AND SUBCUTANEOUS TISSUE: ICD-10-CM

## 2020-02-04 DIAGNOSIS — Z80.8 FAMILY HISTORY OF MALIGNANT NEOPLASM OF OTHER ORGANS OR SYSTEMS: ICD-10-CM

## 2020-02-04 DIAGNOSIS — L40.0 PSORIASIS VULGARIS: ICD-10-CM

## 2020-02-04 DIAGNOSIS — Z84.0 FAMILY HISTORY OF DISEASES OF THE SKIN AND SUBCUTANEOUS TISSUE: ICD-10-CM

## 2020-02-04 LAB
CULTURE RESULTS: SIGNIFICANT CHANGE UP
SPECIMEN SOURCE: SIGNIFICANT CHANGE UP

## 2020-02-04 PROCEDURE — 99214 OFFICE O/P EST MOD 30 MIN: CPT | Mod: 25

## 2020-02-04 PROCEDURE — 96910 PHOTCHMTX TAR&UVB/PTRLTM&UVB: CPT

## 2020-02-04 RX ORDER — CALCIPOTRIENE AND BETAMETHASONE DIPROPIONATE 50; .5 UG/G; MG/G
0.005-0.064 SUSPENSION TOPICAL
Qty: 1 | Refills: 4 | Status: ACTIVE | COMMUNITY
Start: 2020-02-04 | End: 1900-01-01

## 2020-02-04 RX ORDER — CALCIPOTRIENE AND BETAMETHASONE DIPROPIONATE 50; .5 UG/G; MG/G
0.005-0.064 OINTMENT TOPICAL DAILY
Qty: 2 | Refills: 2 | Status: ACTIVE | COMMUNITY
Start: 2020-02-04 | End: 1900-01-01

## 2020-02-04 NOTE — HISTORY OF PRESENT ILLNESS
[FreeTextEntry1] : Psoriasis. [de-identified] : Patient with 20+ years of severe psoriasis, s/p tx with many topicals, UV txs, MTX, cyclosporin.  Recently on Humira with no improvement over 2-3 months (claims worse than when she started).  Now with severe generalized psoriasis.  She is s/p d/c last PM from ER, with a "viral illness" with fevers and arthralgias.  Feels better this AM, but unsure of the nature of her illness - plans to see primary MD later this week.\par Patient notes that her psoriasis usually does very well over the summers, and she has in the past gone to tanning salons for her psoriasis.

## 2020-02-04 NOTE — HISTORY OF PRESENT ILLNESS
[FreeTextEntry1] : Psoriasis. [de-identified] : Patient with 20+ years of severe psoriasis, s/p tx with many topicals, UV txs, MTX, cyclosporin.  Recently on Humira with no improvement over 2-3 months (claims worse than when she started).  Now with severe generalized psoriasis.  She is s/p d/c last PM from ER, with a "viral illness" with fevers and arthralgias.  Feels better this AM, but unsure of the nature of her illness - plans to see primary MD later this week.\par Patient notes that her psoriasis usually does very well over the summers, and she has in the past gone to tanning salons for her psoriasis.

## 2020-02-04 NOTE — PHYSICAL EXAM
[Alert] : alert [Oriented x 3] : ~L oriented x 3 [Well Nourished] : well nourished [Eyelids] : Eyelids [Ears] : Ears [Lips] : Lips [Neck] : Neck [FreeTextEntry3] : Erythematous scaling patches and plaques, of the scalp, neck, back, buttocks, abdomen, chest, UE's and LE's, and includes the buttocks and the vulvar/pubic region.

## 2020-02-04 NOTE — REVIEW OF SYSTEMS
[Joint Pain] : no joint pain [Joint Swelling] : no joint swelling [Stiffness] : no stiffness [Negative] : Genitourinary

## 2020-02-04 NOTE — ASSESSMENT
[FreeTextEntry1] : Psoriasis.\par d/c Humira which has not been effective.\par Hold on any biologic for now, as patient still has unspecified illness, ? viral.\par Recommend:\par Taclonex ointment and suspension, qd.\par NB-UVB txs for now.\par Discussed at length with patient.

## 2020-02-05 ENCOUNTER — APPOINTMENT (OUTPATIENT)
Dept: DERMATOLOGY | Facility: CLINIC | Age: 55
End: 2020-02-05

## 2020-02-06 ENCOUNTER — APPOINTMENT (OUTPATIENT)
Dept: DERMATOLOGY | Facility: CLINIC | Age: 55
End: 2020-02-06

## 2020-02-06 NOTE — DISCUSSION/SUMMARY
[FreeTextEntry1] : The patient presents for UVB therapy.\par The patient tolerated the last treatment well, without significant erythema or a burn.\par The patient confirmed that there are no new medications.\par The patient was wearing the appropriate UV protective eye glasses.\par The treatment was administered as follows:\par         0.2  Joules.     Maximum time   0    minutes and     19   seconds.\par \par   \par Negrete # 1\par \par The patient tolerated the treatment well and was instructed to notify the office if there is a significant\par redness or a burn over the next 48 hours.\par

## 2020-02-08 ENCOUNTER — APPOINTMENT (OUTPATIENT)
Dept: DERMATOLOGY | Facility: CLINIC | Age: 55
End: 2020-02-08
Payer: COMMERCIAL

## 2020-02-08 LAB
CULTURE RESULTS: SIGNIFICANT CHANGE UP
CULTURE RESULTS: SIGNIFICANT CHANGE UP
SPECIMEN SOURCE: SIGNIFICANT CHANGE UP
SPECIMEN SOURCE: SIGNIFICANT CHANGE UP

## 2020-02-08 PROCEDURE — 96910 PHOTCHMTX TAR&UVB/PTRLTM&UVB: CPT

## 2020-02-10 NOTE — DISCUSSION/SUMMARY
[FreeTextEntry1] : The patient presents for UVB light therapy.\par The patient tolerated the last treatment well, without significant erythema or a burn.\par The patient confirmed that there are no new medications.\par The patient wearing the appropriate UV protective eye glasses.\par The treatment was administered as follows:\par 0.4  Joules.  Maximum time  0  minutes and  4  seconds.\par \par Negrete #:  1\par \par The patient tolerated the treatment well and was instructed to notify the office if there is significant redness or a burn over the next 48 hours.\par \par \par

## 2020-05-10 ENCOUNTER — TRANSCRIPTION ENCOUNTER (OUTPATIENT)
Age: 55
End: 2020-05-10

## 2020-08-28 ENCOUNTER — TRANSCRIPTION ENCOUNTER (OUTPATIENT)
Age: 55
End: 2020-08-28

## 2021-04-09 ENCOUNTER — EMERGENCY (EMERGENCY)
Facility: HOSPITAL | Age: 56
LOS: 0 days | Discharge: HOME | End: 2021-04-09
Attending: EMERGENCY MEDICINE
Payer: COMMERCIAL

## 2021-04-09 VITALS
TEMPERATURE: 98 F | RESPIRATION RATE: 18 BRPM | SYSTOLIC BLOOD PRESSURE: 119 MMHG | DIASTOLIC BLOOD PRESSURE: 78 MMHG | HEART RATE: 68 BPM | OXYGEN SATURATION: 96 %

## 2021-04-09 VITALS — HEIGHT: 69 IN | WEIGHT: 199.96 LBS

## 2021-04-09 DIAGNOSIS — Z87.828 PERSONAL HISTORY OF OTHER (HEALED) PHYSICAL INJURY AND TRAUMA: Chronic | ICD-10-CM

## 2021-04-09 DIAGNOSIS — Z98.84 BARIATRIC SURGERY STATUS: Chronic | ICD-10-CM

## 2021-04-09 DIAGNOSIS — Z90.49 ACQUIRED ABSENCE OF OTHER SPECIFIED PARTS OF DIGESTIVE TRACT: Chronic | ICD-10-CM

## 2021-04-09 DIAGNOSIS — R05 COUGH: ICD-10-CM

## 2021-04-09 DIAGNOSIS — Z98.89 OTHER SPECIFIED POSTPROCEDURAL STATES: Chronic | ICD-10-CM

## 2021-04-09 DIAGNOSIS — Z87.59 PERSONAL HISTORY OF OTHER COMPLICATIONS OF PREGNANCY, CHILDBIRTH AND THE PUERPERIUM: ICD-10-CM

## 2021-04-09 DIAGNOSIS — Z98.84 BARIATRIC SURGERY STATUS: ICD-10-CM

## 2021-04-09 DIAGNOSIS — Z86.19 PERSONAL HISTORY OF OTHER INFECTIOUS AND PARASITIC DISEASES: ICD-10-CM

## 2021-04-09 DIAGNOSIS — D64.9 ANEMIA, UNSPECIFIED: ICD-10-CM

## 2021-04-09 DIAGNOSIS — L40.9 PSORIASIS, UNSPECIFIED: ICD-10-CM

## 2021-04-09 DIAGNOSIS — R10.31 RIGHT LOWER QUADRANT PAIN: ICD-10-CM

## 2021-04-09 DIAGNOSIS — D25.9 LEIOMYOMA OF UTERUS, UNSPECIFIED: ICD-10-CM

## 2021-04-09 LAB
ALBUMIN SERPL ELPH-MCNC: 3.6 G/DL — SIGNIFICANT CHANGE UP (ref 3.3–5)
ALP SERPL-CCNC: 111 U/L — SIGNIFICANT CHANGE UP (ref 40–120)
ALT FLD-CCNC: 27 U/L — SIGNIFICANT CHANGE UP (ref 12–78)
ANION GAP SERPL CALC-SCNC: 7 MMOL/L — SIGNIFICANT CHANGE UP (ref 5–17)
APPEARANCE UR: ABNORMAL
APTT BLD: 31.6 SEC — SIGNIFICANT CHANGE UP (ref 27.5–35.5)
AST SERPL-CCNC: 29 U/L — SIGNIFICANT CHANGE UP (ref 15–37)
BASOPHILS # BLD AUTO: 0.03 K/UL — SIGNIFICANT CHANGE UP (ref 0–0.2)
BASOPHILS NFR BLD AUTO: 0.7 % — SIGNIFICANT CHANGE UP (ref 0–2)
BILIRUB SERPL-MCNC: 0.6 MG/DL — SIGNIFICANT CHANGE UP (ref 0.2–1.2)
BILIRUB UR-MCNC: ABNORMAL
BUN SERPL-MCNC: 15 MG/DL — SIGNIFICANT CHANGE UP (ref 7–23)
CALCIUM SERPL-MCNC: 8.7 MG/DL — SIGNIFICANT CHANGE UP (ref 8.5–10.1)
CHLORIDE SERPL-SCNC: 108 MMOL/L — SIGNIFICANT CHANGE UP (ref 96–108)
CO2 SERPL-SCNC: 23 MMOL/L — SIGNIFICANT CHANGE UP (ref 22–31)
COLOR SPEC: YELLOW — SIGNIFICANT CHANGE UP
CREAT SERPL-MCNC: 0.53 MG/DL — SIGNIFICANT CHANGE UP (ref 0.5–1.3)
DIFF PNL FLD: ABNORMAL
EOSINOPHIL # BLD AUTO: 0.04 K/UL — SIGNIFICANT CHANGE UP (ref 0–0.5)
EOSINOPHIL NFR BLD AUTO: 0.9 % — SIGNIFICANT CHANGE UP (ref 0–6)
GLUCOSE SERPL-MCNC: 121 MG/DL — HIGH (ref 70–99)
GLUCOSE UR QL: NEGATIVE MG/DL — SIGNIFICANT CHANGE UP
HCT VFR BLD CALC: 40.6 % — SIGNIFICANT CHANGE UP (ref 34.5–45)
HGB BLD-MCNC: 14.2 G/DL — SIGNIFICANT CHANGE UP (ref 11.5–15.5)
IMM GRANULOCYTES NFR BLD AUTO: 0.2 % — SIGNIFICANT CHANGE UP (ref 0–1.5)
INR BLD: 1.07 RATIO — SIGNIFICANT CHANGE UP (ref 0.88–1.16)
KETONES UR-MCNC: ABNORMAL
LEUKOCYTE ESTERASE UR-ACNC: ABNORMAL
LIDOCAIN IGE QN: 103 U/L — SIGNIFICANT CHANGE UP (ref 73–393)
LYMPHOCYTES # BLD AUTO: 1.85 K/UL — SIGNIFICANT CHANGE UP (ref 1–3.3)
LYMPHOCYTES # BLD AUTO: 41.4 % — SIGNIFICANT CHANGE UP (ref 13–44)
MCHC RBC-ENTMCNC: 29.7 PG — SIGNIFICANT CHANGE UP (ref 27–34)
MCHC RBC-ENTMCNC: 35 GM/DL — SIGNIFICANT CHANGE UP (ref 32–36)
MCV RBC AUTO: 84.9 FL — SIGNIFICANT CHANGE UP (ref 80–100)
MONOCYTES # BLD AUTO: 0.21 K/UL — SIGNIFICANT CHANGE UP (ref 0–0.9)
MONOCYTES NFR BLD AUTO: 4.7 % — SIGNIFICANT CHANGE UP (ref 2–14)
NEUTROPHILS # BLD AUTO: 2.33 K/UL — SIGNIFICANT CHANGE UP (ref 1.8–7.4)
NEUTROPHILS NFR BLD AUTO: 52.1 % — SIGNIFICANT CHANGE UP (ref 43–77)
NITRITE UR-MCNC: NEGATIVE — SIGNIFICANT CHANGE UP
PH UR: 5 — SIGNIFICANT CHANGE UP (ref 5–8)
PLATELET # BLD AUTO: 203 K/UL — SIGNIFICANT CHANGE UP (ref 150–400)
POTASSIUM SERPL-MCNC: 4.1 MMOL/L — SIGNIFICANT CHANGE UP (ref 3.5–5.3)
POTASSIUM SERPL-SCNC: 4.1 MMOL/L — SIGNIFICANT CHANGE UP (ref 3.5–5.3)
PROT SERPL-MCNC: 7.2 GM/DL — SIGNIFICANT CHANGE UP (ref 6–8.3)
PROT UR-MCNC: NEGATIVE MG/DL — SIGNIFICANT CHANGE UP
PROTHROM AB SERPL-ACNC: 12.4 SEC — SIGNIFICANT CHANGE UP (ref 10.6–13.6)
RBC # BLD: 4.78 M/UL — SIGNIFICANT CHANGE UP (ref 3.8–5.2)
RBC # FLD: 12.6 % — SIGNIFICANT CHANGE UP (ref 10.3–14.5)
SODIUM SERPL-SCNC: 138 MMOL/L — SIGNIFICANT CHANGE UP (ref 135–145)
SP GR SPEC: 1.02 — SIGNIFICANT CHANGE UP (ref 1.01–1.02)
UROBILINOGEN FLD QL: 4 MG/DL
WBC # BLD: 4.47 K/UL — SIGNIFICANT CHANGE UP (ref 3.8–10.5)
WBC # FLD AUTO: 4.47 K/UL — SIGNIFICANT CHANGE UP (ref 3.8–10.5)

## 2021-04-09 PROCEDURE — 80053 COMPREHEN METABOLIC PANEL: CPT

## 2021-04-09 PROCEDURE — 83690 ASSAY OF LIPASE: CPT

## 2021-04-09 PROCEDURE — 85025 COMPLETE CBC W/AUTO DIFF WBC: CPT

## 2021-04-09 PROCEDURE — 96374 THER/PROPH/DIAG INJ IV PUSH: CPT | Mod: XU

## 2021-04-09 PROCEDURE — 74177 CT ABD & PELVIS W/CONTRAST: CPT | Mod: 26

## 2021-04-09 PROCEDURE — 96361 HYDRATE IV INFUSION ADD-ON: CPT

## 2021-04-09 PROCEDURE — 99285 EMERGENCY DEPT VISIT HI MDM: CPT

## 2021-04-09 PROCEDURE — 86850 RBC ANTIBODY SCREEN: CPT

## 2021-04-09 PROCEDURE — 96375 TX/PRO/DX INJ NEW DRUG ADDON: CPT | Mod: XU

## 2021-04-09 PROCEDURE — 74177 CT ABD & PELVIS W/CONTRAST: CPT

## 2021-04-09 PROCEDURE — 85610 PROTHROMBIN TIME: CPT

## 2021-04-09 PROCEDURE — 76830 TRANSVAGINAL US NON-OB: CPT | Mod: 26

## 2021-04-09 PROCEDURE — 36415 COLL VENOUS BLD VENIPUNCTURE: CPT

## 2021-04-09 PROCEDURE — 85730 THROMBOPLASTIN TIME PARTIAL: CPT

## 2021-04-09 PROCEDURE — 76830 TRANSVAGINAL US NON-OB: CPT

## 2021-04-09 PROCEDURE — 87086 URINE CULTURE/COLONY COUNT: CPT

## 2021-04-09 PROCEDURE — 99285 EMERGENCY DEPT VISIT HI MDM: CPT | Mod: 25

## 2021-04-09 PROCEDURE — 81001 URINALYSIS AUTO W/SCOPE: CPT

## 2021-04-09 PROCEDURE — 86900 BLOOD TYPING SEROLOGIC ABO: CPT

## 2021-04-09 PROCEDURE — 86901 BLOOD TYPING SEROLOGIC RH(D): CPT

## 2021-04-09 RX ORDER — ONDANSETRON 8 MG/1
4 TABLET, FILM COATED ORAL ONCE
Refills: 0 | Status: COMPLETED | OUTPATIENT
Start: 2021-04-09 | End: 2021-04-09

## 2021-04-09 RX ORDER — KETOROLAC TROMETHAMINE 30 MG/ML
15 SYRINGE (ML) INJECTION ONCE
Refills: 0 | Status: DISCONTINUED | OUTPATIENT
Start: 2021-04-09 | End: 2021-04-09

## 2021-04-09 RX ORDER — OXYCODONE AND ACETAMINOPHEN 5; 325 MG/1; MG/1
1 TABLET ORAL ONCE
Refills: 0 | Status: DISCONTINUED | OUTPATIENT
Start: 2021-04-09 | End: 2021-04-09

## 2021-04-09 RX ORDER — SODIUM CHLORIDE 9 MG/ML
1000 INJECTION INTRAMUSCULAR; INTRAVENOUS; SUBCUTANEOUS ONCE
Refills: 0 | Status: COMPLETED | OUTPATIENT
Start: 2021-04-09 | End: 2021-04-09

## 2021-04-09 RX ORDER — MORPHINE SULFATE 50 MG/1
4 CAPSULE, EXTENDED RELEASE ORAL ONCE
Refills: 0 | Status: DISCONTINUED | OUTPATIENT
Start: 2021-04-09 | End: 2021-04-09

## 2021-04-09 RX ADMIN — SODIUM CHLORIDE 1000 MILLILITER(S): 9 INJECTION INTRAMUSCULAR; INTRAVENOUS; SUBCUTANEOUS at 13:15

## 2021-04-09 RX ADMIN — MORPHINE SULFATE 4 MILLIGRAM(S): 50 CAPSULE, EXTENDED RELEASE ORAL at 10:39

## 2021-04-09 RX ADMIN — Medication 15 MILLIGRAM(S): at 11:52

## 2021-04-09 RX ADMIN — SODIUM CHLORIDE 1000 MILLILITER(S): 9 INJECTION INTRAMUSCULAR; INTRAVENOUS; SUBCUTANEOUS at 10:39

## 2021-04-09 RX ADMIN — OXYCODONE AND ACETAMINOPHEN 1 TABLET(S): 5; 325 TABLET ORAL at 13:57

## 2021-04-09 RX ADMIN — MORPHINE SULFATE 4 MILLIGRAM(S): 50 CAPSULE, EXTENDED RELEASE ORAL at 13:59

## 2021-04-09 RX ADMIN — Medication 15 MILLIGRAM(S): at 13:59

## 2021-04-09 RX ADMIN — ONDANSETRON 4 MILLIGRAM(S): 8 TABLET, FILM COATED ORAL at 10:39

## 2021-04-09 NOTE — ED STATDOCS - NSFOLLOWUPINSTRUCTIONS_ED_ALL_ED_FT
Follow up with your GYN     Uterine Fibroids       Uterine fibroids (leiomyomas) are noncancerous (benign) tumors that can develop in the uterus. Fibroids may also develop in the fallopian tubes, cervix, or tissues (ligaments) near the uterus.    You may have one or many fibroids. Fibroids vary in size, weight, and where they grow in the uterus. Some can become quite large. Most fibroids do not require medical treatment.      What are the causes?    The cause of this condition is not known.      What increases the risk?  You are more likely to develop this condition if you:  •Are in your 30s or 40s and have not gone through menopause.      •Have a family history of this condition.      •Are of -American descent.      •Had your first period at an early age (early menarche).      •Have not had any children (nulliparity).      •Are overweight or obese.        What are the signs or symptoms?  Many women do not have any symptoms. Symptoms of this condition may include:  •Heavy menstrual bleeding.      •Bleeding or spotting between periods.      •Pain and pressure in the pelvic area, between the hips.      •Bladder problems, such as needing to urinate urgently or more often than usual.      •Inability to have children (infertility).      •Failure to carry pregnancy to term (miscarriage).        How is this diagnosed?  This condition may be diagnosed based on:  •Your symptoms and medical history.      •A physical exam.      •A pelvic exam that includes feeling for any tumors.      •Imaging tests, such as ultrasound or MRI.        How is this treated?  Treatment for this condition may include:  •Seeing your health care provider for follow-up visits to monitor your fibroids for any changes.      •Taking NSAIDs such as ibuprofen, naproxen, or aspirin to reduce pain.      •Hormone medicines. These may be taken as a pill, given in an injection, or delivered by a T-shaped device that is inserted into the uterus (intrauterine device, IUD).    •Surgery to remove one of the following:  •The fibroids (myomectomy). Your health care provider may recommend this if fibroids affect your fertility and you want to become pregnant.      •The uterus (hysterectomy).      •Blood supply to the fibroids (uterine artery embolization).          Follow these instructions at home:    •Take over-the-counter and prescription medicines only as told by your health care provider.      •Ask your health care provider if you should take iron pills or eat more iron-rich foods, such as dark green, leafy vegetables. Heavy menstrual bleeding can cause low iron levels.    •If directed, apply heat to your back or abdomen to reduce pain. Use the heat source that your health care provider recommends, such as a moist heat pack or a heating pad.  •Place a towel between your skin and the heat source.      •Leave the heat on for 20–30 minutes.      •Remove the heat if your skin turns bright red. This is especially important if you are unable to feel pain, heat, or cold. You may have a greater risk of getting burned.      •Pay close attention to your menstrual cycle. Tell your health care provider about any changes, such as:  •Increased blood flow that requires you to use more pads or tampons than usual.      •A change in the number of days that your period lasts.      •A change in symptoms that are associated with your period, such as back pain or cramps in your abdomen.        •Keep all follow-up visits as told by your health care provider. This is important, especially if your fibroids need to be monitored for any changes.        Contact a health care provider if you:    •Have pelvic pain, back pain, or cramps in your abdomen that do not get better with medicine or heat.      •Develop new bleeding between periods.      •Have increased bleeding during or between periods.      •Feel unusually tired or weak.      •Feel light-headed.        Get help right away if you:    •Faint.      •Have pelvic pain that suddenly gets worse.      •Have severe vaginal bleeding that soaks a tampon or pad in 30 minutes or less.        Summary    •Uterine fibroids are noncancerous (benign) tumors that can develop in the uterus.      •The exact cause of this condition is not known.      •Most fibroids do not require medical treatment unless they affect your ability to have children (fertility).      •Contact a health care provider if you have pelvic pain, back pain, or cramps in your abdomen that do not get better with medicines.      •Make sure you know what symptoms should cause you to get help right away.      This information is not intended to replace advice given to you by your health care provider. Make sure you discuss any questions you have with your health care provider.

## 2021-04-09 NOTE — ED STATDOCS - PATIENT PORTAL LINK FT
You can access the FollowMyHealth Patient Portal offered by [facility] by registering at the following website: [email or web address]. By joining [employer]’s FollowMyHealth portal, you will also be able to view your health information using other applications (apps) compatible with our system.

## 2021-04-09 NOTE — ED STATDOCS - TOBACCO USE
81 yo F hx of cad s/p cabg, AICD placement, HTN presented to the ED for incidental finding of hyptension at urgernt care. She report a mechanical fall on her knee when she was reaching for things on the top shelf. She cristy chest pain, sob, or dizzinses. She went to urgent care for the knee pain. She report the xray was negative. When her BP was taken, she reported it was around 85/40. Family reported she took her BP meds 1 hr prior to going to urgent care. Currently patient denies chest pain, sob, dizziness, or weakness. BP now normotensive.    cards: 
Unknown if ever smoked

## 2021-04-09 NOTE — ED ADULT NURSE NOTE - BREATHING, MLM
This patient has been assessed with a concern for Malnutrition and was treated during this hospitalization for the following Nutrition diagnosis/diagnoses:     -  12/11/2020: Moderate protein-calorie malnutrition   This patient has been assessed with a concern for Malnutrition and was treated during this hospitalization for the following Nutrition diagnosis/diagnoses:     -  12/11/2020: Moderate protein-calorie malnutrition    This patient has been assessed with a concern for Malnutrition and was treated during this hospitalization for the following Nutrition diagnosis/diagnoses:     -  12/11/2020: Moderate protein-calorie malnutrition   This patient has been assessed with a concern for Malnutrition and was treated during this hospitalization for the following Nutrition diagnosis/diagnoses:     -  12/11/2020: Moderate protein-calorie malnutrition    This patient has been assessed with a concern for Malnutrition and was treated during this hospitalization for the following Nutrition diagnosis/diagnoses:     -  12/11/2020: Moderate protein-calorie malnutrition    This patient has been assessed with a concern for Malnutrition and was treated during this hospitalization for the following Nutrition diagnosis/diagnoses:     -  12/11/2020: Moderate protein-calorie malnutrition   Spontaneous, unlabored and symmetrical

## 2021-04-09 NOTE — ED STATDOCS - PROGRESS NOTE DETAILS
Patient seen and evaluated in intake with ED Attending,  ED attending note and orders reviewed, will continue with patient follow up and care -Therese Fontanez PA-C pt aware of imaging and lab results will fu with her GYN monday. pt agrees with plan and well appearing on dc. -Therese Fontanez PA-C

## 2021-04-09 NOTE — ED STATDOCS - OBJECTIVE STATEMENT
54 y/o female with a PMHx of anemia, kidney infection, psoriasis, h/o gastric bypass 22 years ago, h/o  x3 presents to the ED c/o right lower abd pain x days. Pain radiates to right flank. Pain exacerbated with coughing. Denies fevers, dysuria, hematuria. No other complaints at this time.

## 2021-04-09 NOTE — ED ADULT NURSE NOTE - OBJECTIVE STATEMENT
Pt presents to er with complaints of right sided flank pain, denies urinary obstruction, states she does receive infusions for anemia, pt ambulatory with steady gait, will continue to monitor.

## 2021-04-09 NOTE — ED STATDOCS - CLINICAL SUMMARY MEDICAL DECISION MAKING FREE TEXT BOX
Labs and imaging performed.  Pt with small uterine fibroid.  D/c home in good condition, f/u with gynecology.

## 2021-04-09 NOTE — ED ADULT TRIAGE NOTE - NS ED NURSE BANDS TYPE
Pt screaming at staff to use the restroom. RNs offered bed pan and pt screamed that she couldn't. Pt was reminded that we were here to help and she was not able to walk to the restroom. Pt became rude and screaming louder that she was getting out of bed. Pure wick brought to the bedside and bedside commode. Pt was assisted to bedside commode and sample was collected. Pt was assisted back into bed and apologized to staff. Fall precautions reinitiated and call light in reach.       Thai Del Real RN  02/01/21 9298 Name band;

## 2021-04-10 LAB
CULTURE RESULTS: SIGNIFICANT CHANGE UP
SPECIMEN SOURCE: SIGNIFICANT CHANGE UP

## 2021-06-01 NOTE — ED STATDOCS - RESPIRATORY, MLM
Pt arrived ambulatory to IS for monthly Nucala injection.  POC discussed, pt denies active infections today.  Nucala given as ordered to back of L arm, site covered with adhesive bandage.  Pt tolerated well.  Next appointment confirmed.  Pt discharged from IS in NAD under self care.    breath sounds clear and equal bilaterally.

## 2023-02-13 NOTE — ED ADULT TRIAGE NOTE - HEIGHT IN CM
Post-Op Assessment Note    CV Status:  Stable    Pain management: adequate     Mental Status:  Alert and awake   Hydration Status:  Euvolemic   PONV Controlled:  Controlled   Airway Patency:  Patent      Post Op Vitals Reviewed: Yes      Staff: Anesthesiologist         No notable events documented      /76 (02/13/23 1023)    Temp      Pulse 66 (02/13/23 1023)   Resp 21 (02/13/23 1023)    SpO2 97 % (02/13/23 1023) 175.26
